# Patient Record
Sex: MALE
[De-identification: names, ages, dates, MRNs, and addresses within clinical notes are randomized per-mention and may not be internally consistent; named-entity substitution may affect disease eponyms.]

---

## 2017-09-01 PROBLEM — Z00.00 ENCOUNTER FOR PREVENTIVE HEALTH EXAMINATION: Status: ACTIVE | Noted: 2017-09-01

## 2017-12-15 ENCOUNTER — APPOINTMENT (OUTPATIENT)
Dept: OTOLARYNGOLOGY | Facility: CLINIC | Age: 27
End: 2017-12-15

## 2018-04-14 ENCOUNTER — EMERGENCY (EMERGENCY)
Facility: HOSPITAL | Age: 28
LOS: 0 days | Discharge: HOME | End: 2018-04-14
Attending: EMERGENCY MEDICINE

## 2018-04-14 VITALS
TEMPERATURE: 98 F | RESPIRATION RATE: 18 BRPM | HEART RATE: 110 BPM | OXYGEN SATURATION: 99 % | SYSTOLIC BLOOD PRESSURE: 145 MMHG | DIASTOLIC BLOOD PRESSURE: 89 MMHG

## 2018-04-14 VITALS
HEART RATE: 90 BPM | SYSTOLIC BLOOD PRESSURE: 126 MMHG | DIASTOLIC BLOOD PRESSURE: 80 MMHG | OXYGEN SATURATION: 98 % | TEMPERATURE: 98 F | RESPIRATION RATE: 18 BRPM

## 2018-04-14 DIAGNOSIS — Z98.890 OTHER SPECIFIED POSTPROCEDURAL STATES: ICD-10-CM
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
## 2018-04-14 DIAGNOSIS — Z98.1 ARTHRODESIS STATUS: Chronic | ICD-10-CM
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
## 2018-04-14 DIAGNOSIS — S80.12XA CONTUSION OF LEFT LOWER LEG, INITIAL ENCOUNTER: ICD-10-CM

## 2018-04-14 DIAGNOSIS — M79.662 PAIN IN LEFT LOWER LEG: ICD-10-CM

## 2018-04-14 LAB
ANION GAP SERPL CALC-SCNC: 13 MMOL/L — SIGNIFICANT CHANGE UP (ref 7–14)
BASOPHILS # BLD AUTO: 0.1 K/UL — SIGNIFICANT CHANGE UP (ref 0–0.2)
BASOPHILS NFR BLD AUTO: 1 % — SIGNIFICANT CHANGE UP (ref 0–1)
BUN SERPL-MCNC: 14 MG/DL — SIGNIFICANT CHANGE UP (ref 10–20)
CALCIUM SERPL-MCNC: 9.3 MG/DL — SIGNIFICANT CHANGE UP (ref 8.5–10.1)
CHLORIDE SERPL-SCNC: 100 MMOL/L — SIGNIFICANT CHANGE UP (ref 98–110)
CO2 SERPL-SCNC: 24 MMOL/L — SIGNIFICANT CHANGE UP (ref 17–32)
CREAT SERPL-MCNC: 0.8 MG/DL — SIGNIFICANT CHANGE UP (ref 0.7–1.5)
EOSINOPHIL # BLD AUTO: 0.48 K/UL — SIGNIFICANT CHANGE UP (ref 0–0.7)
EOSINOPHIL NFR BLD AUTO: 5 % — SIGNIFICANT CHANGE UP (ref 0–8)
GLUCOSE SERPL-MCNC: 96 MG/DL — SIGNIFICANT CHANGE UP (ref 70–99)
HCT VFR BLD CALC: 44.5 % — SIGNIFICANT CHANGE UP (ref 42–52)
HGB BLD-MCNC: 15.2 G/DL — SIGNIFICANT CHANGE UP (ref 14–18)
IMM GRANULOCYTES NFR BLD AUTO: 0.2 % — SIGNIFICANT CHANGE UP (ref 0.1–0.3)
LACTATE SERPL-SCNC: 1.8 MMOL/L — SIGNIFICANT CHANGE UP (ref 0.5–2.2)
LYMPHOCYTES # BLD AUTO: 2.51 K/UL — SIGNIFICANT CHANGE UP (ref 1.2–3.4)
LYMPHOCYTES # BLD AUTO: 26.1 % — SIGNIFICANT CHANGE UP (ref 20.5–51.1)
MCHC RBC-ENTMCNC: 29.5 PG — SIGNIFICANT CHANGE UP (ref 27–31)
MCHC RBC-ENTMCNC: 34.2 G/DL — SIGNIFICANT CHANGE UP (ref 32–37)
MCV RBC AUTO: 86.2 FL — SIGNIFICANT CHANGE UP (ref 80–94)
MONOCYTES # BLD AUTO: 0.85 K/UL — HIGH (ref 0.1–0.6)
MONOCYTES NFR BLD AUTO: 8.8 % — SIGNIFICANT CHANGE UP (ref 1.7–9.3)
NEUTROPHILS # BLD AUTO: 5.66 K/UL — SIGNIFICANT CHANGE UP (ref 1.4–6.5)
NEUTROPHILS NFR BLD AUTO: 58.9 % — SIGNIFICANT CHANGE UP (ref 42.2–75.2)
NRBC # BLD: 0 /100 WBCS — SIGNIFICANT CHANGE UP (ref 0–0)
PLATELET # BLD AUTO: 381 K/UL — SIGNIFICANT CHANGE UP (ref 130–400)
POTASSIUM SERPL-MCNC: 4.4 MMOL/L — SIGNIFICANT CHANGE UP (ref 3.5–5)
POTASSIUM SERPL-SCNC: 4.4 MMOL/L — SIGNIFICANT CHANGE UP (ref 3.5–5)
RBC # BLD: 5.16 M/UL — SIGNIFICANT CHANGE UP (ref 4.7–6.1)
RBC # FLD: 13 % — SIGNIFICANT CHANGE UP (ref 11.5–14.5)
SODIUM SERPL-SCNC: 137 MMOL/L — SIGNIFICANT CHANGE UP (ref 135–146)
WBC # BLD: 9.62 K/UL — SIGNIFICANT CHANGE UP (ref 4.8–10.8)
WBC # FLD AUTO: 9.62 K/UL — SIGNIFICANT CHANGE UP (ref 4.8–10.8)

## 2018-04-14 RX ORDER — CEPHALEXIN 500 MG
500 CAPSULE ORAL EVERY 12 HOURS
Qty: 0 | Refills: 0 | Status: DISCONTINUED | OUTPATIENT
Start: 2018-04-14 | End: 2018-04-14

## 2018-04-14 RX ORDER — KETOROLAC TROMETHAMINE 30 MG/ML
30 SYRINGE (ML) INJECTION ONCE
Qty: 0 | Refills: 0 | Status: DISCONTINUED | OUTPATIENT
Start: 2018-04-14 | End: 2018-04-14

## 2018-04-14 RX ADMIN — Medication 30 MILLIGRAM(S): at 16:51

## 2018-04-14 RX ADMIN — Medication 500 MILLIGRAM(S): at 16:50

## 2018-04-14 NOTE — ED PROVIDER NOTE - PROGRESS NOTE DETAILS
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  Pt voices understanding of use of medications, instructions for care, and reasons to return or to go to ED  Discussed rest, ice, compression, and elevation with patient.   Discussed NSAIDs for anti-inflammatory and pain relief.  Patient advised to f/u with ortho  Pt extensively counseled - warning si/sxs d/w pt. Case d/w pt at length including risks vs benefits of different management/treatment options. Pt instructed to return to ed for re-evaluation or f/u with PCP should sxs persist. Pt verbalizes an understanding & agreement with the plan as discussed

## 2018-04-14 NOTE — ED PROVIDER NOTE - ATTENDING CONTRIBUTION TO CARE
Fell and injured the left shin about one week ago. At that time pt has a lot of swelling and black and blue. Since then the pain, swelling at site of injury and color has been improving. Pt concerned about developing swelling at the ankle. No fever. On exam healing bruise to the left shin. Pt has dependent edema below. No signs of infection. IMP: natural course of injury. Hematoma is resolving. Edema is expected. no calf tenderness. Normal gait. Follow up as needed.

## 2018-04-14 NOTE — ED PROVIDER NOTE - OBJECTIVE STATEMENT
slip/fall getting out of car 1 week ago, banged left leg against car and now has pain, swelling  improving, but still there and given prior h/o ankle surgery wanted to get eval

## 2018-04-14 NOTE — ED PROVIDER NOTE - NS ED ROS FT
Constitutional: no fever, chills, no recent weight loss, change in appetite or malaise  Eyes: no redness/discharge/pain/vision changes  ENT: no rhinorrhea/ear pain/sore throat  Cardiac: No chest pain, SOB or edema.  Respiratory: No cough or respiratory distress  GI: No nausea, vomiting, diarrhea or abdominal pain.  : No dysuria, frequency, urgency or hematuria  MS: no pain to back or other extremities, no loss of ROM, no weakness  Neuro: No headache or weakness. No LOC.  Skin: No skin rash.  Endocrine: No history of thyroid disease or diabetes.  Except as documented in the HPI, all other systems are negative.

## 2019-04-26 ENCOUNTER — EMERGENCY (EMERGENCY)
Facility: HOSPITAL | Age: 29
LOS: 0 days | Discharge: HOME | End: 2019-04-26
Attending: STUDENT IN AN ORGANIZED HEALTH CARE EDUCATION/TRAINING PROGRAM | Admitting: STUDENT IN AN ORGANIZED HEALTH CARE EDUCATION/TRAINING PROGRAM
Payer: MEDICAID

## 2019-04-26 VITALS
SYSTOLIC BLOOD PRESSURE: 141 MMHG | TEMPERATURE: 98 F | HEART RATE: 98 BPM | WEIGHT: 250 LBS | OXYGEN SATURATION: 97 % | RESPIRATION RATE: 18 BRPM | DIASTOLIC BLOOD PRESSURE: 85 MMHG

## 2019-04-26 DIAGNOSIS — M79.673 PAIN IN UNSPECIFIED FOOT: ICD-10-CM

## 2019-04-26 DIAGNOSIS — Z91.013 ALLERGY TO SEAFOOD: ICD-10-CM

## 2019-04-26 DIAGNOSIS — M79.671 PAIN IN RIGHT FOOT: ICD-10-CM

## 2019-04-26 DIAGNOSIS — Z98.1 ARTHRODESIS STATUS: Chronic | ICD-10-CM

## 2019-04-26 DIAGNOSIS — L84 CORNS AND CALLOSITIES: ICD-10-CM

## 2019-04-26 PROCEDURE — 99283 EMERGENCY DEPT VISIT LOW MDM: CPT | Mod: 25

## 2019-04-26 PROCEDURE — 73630 X-RAY EXAM OF FOOT: CPT | Mod: 26,RT

## 2019-04-26 RX ORDER — IBUPROFEN 200 MG
600 TABLET ORAL ONCE
Qty: 0 | Refills: 0 | Status: COMPLETED | OUTPATIENT
Start: 2019-04-26 | End: 2019-04-26

## 2019-04-26 RX ADMIN — Medication 600 MILLIGRAM(S): at 04:56

## 2019-04-26 NOTE — ED PROVIDER NOTE - PHYSICAL EXAMINATION
VITAL SIGNS: I have reviewed nursing notes and confirm.  CONSTITUTIONAL: Well-developed; well-nourished; in no acute distress. pt comfortable.  SKIN: skin exam is warm and dry, erythematous callus formation  to medial base of 1st toe r.  HEAD: Normocephalic; atraumatic.  EYES:  EOM intact; conjunctiva and sclera clear.  ENT: No nasal discharge; airway clear. moist oral mucosa  NECK: Supple; non tender.  CARD: S1, S2 normal; no murmurs, gallops, or rubs. Regular rate and rhythm. posterior tibial and radial pulses 2+  RESP: No wheezes, rales or rhonchi. cta b/l. no use of accessory muscles. no retractions  EXT: Normal ROM. No  cyanosis or edema  NEURO: Alert, oriented, grossly unremarkable.    PSYCH: Cooperative, appropriate.

## 2019-04-26 NOTE — ED PROVIDER NOTE - ATTENDING CONTRIBUTION TO CARE
28 yo m here for atraumatic R foot pain. more so base of 1st toe distal/medial. no fever/chills/erythema    vss  gen- NAD, aaox3  card-rrr  lungs-ctab, no wheezing or rhonchi  abd-sntnd, no guarding or rebound  neuro- full str/sensation, cn ii-xii grossly intact, normal coordination   R foot- no swelling/erythema, mild tenderness to base of 1st toe, no gross deformity, no hot/tender joint    likely sprain/overuse injury  xr r/o fx  no evidence of septic or inflammatory joint

## 2019-04-26 NOTE — ED PROVIDER NOTE - CLINICAL SUMMARY MEDICAL DECISION MAKING FREE TEXT BOX
likely sprain/overuse injury  xr w/o fx or dislocation  no evidence of septic or inflammatory joint    splint, pod/ortho f/u

## 2019-04-26 NOTE — ED PROVIDER NOTE - OBJECTIVE STATEMENT
30y/o M w/ sleep apnea and copd presents for r. foot pain that started 2-3 days ago it's on the base of the 1st toe dorsally and medially.  no fevers or chills. no trauma to the area pt remembers. mild redness

## 2019-06-01 ENCOUNTER — OUTPATIENT (OUTPATIENT)
Dept: OUTPATIENT SERVICES | Facility: HOSPITAL | Age: 29
LOS: 1 days | End: 2019-06-01

## 2019-06-01 DIAGNOSIS — Z98.1 ARTHRODESIS STATUS: Chronic | ICD-10-CM

## 2019-06-14 DIAGNOSIS — Z71.89 OTHER SPECIFIED COUNSELING: ICD-10-CM

## 2019-09-28 ENCOUNTER — OUTPATIENT (OUTPATIENT)
Dept: OUTPATIENT SERVICES | Facility: HOSPITAL | Age: 29
LOS: 1 days | Discharge: HOME | End: 2019-09-28

## 2019-09-28 DIAGNOSIS — Z13.220 ENCOUNTER FOR SCREENING FOR LIPOID DISORDERS: ICD-10-CM

## 2019-09-28 DIAGNOSIS — Z13.0 ENCOUNTER FOR SCREENING FOR DISEASES OF THE BLOOD AND BLOOD-FORMING ORGANS AND CERTAIN DISORDERS INVOLVING THE IMMUNE MECHANISM: ICD-10-CM

## 2019-09-28 DIAGNOSIS — Z00.00 ENCOUNTER FOR GENERAL ADULT MEDICAL EXAMINATION WITHOUT ABNORMAL FINDINGS: ICD-10-CM

## 2019-09-28 DIAGNOSIS — Z98.1 ARTHRODESIS STATUS: Chronic | ICD-10-CM

## 2019-09-28 DIAGNOSIS — Z13.21 ENCOUNTER FOR SCREENING FOR NUTRITIONAL DISORDER: ICD-10-CM

## 2019-09-28 DIAGNOSIS — Z13.228 ENCOUNTER FOR SCREENING FOR OTHER METABOLIC DISORDERS: ICD-10-CM

## 2019-10-01 ENCOUNTER — OUTPATIENT (OUTPATIENT)
Dept: OUTPATIENT SERVICES | Facility: HOSPITAL | Age: 29
LOS: 1 days | End: 2019-10-01
Payer: MEDICAID

## 2019-10-01 DIAGNOSIS — Z98.1 ARTHRODESIS STATUS: Chronic | ICD-10-CM

## 2019-10-07 DIAGNOSIS — Z71.89 OTHER SPECIFIED COUNSELING: ICD-10-CM

## 2020-06-15 PROCEDURE — G9005: CPT

## 2025-06-12 ENCOUNTER — NON-APPOINTMENT (OUTPATIENT)
Age: 35
End: 2025-06-12

## 2025-07-11 ENCOUNTER — OFFICE VISIT (OUTPATIENT)
Dept: URGENT CARE | Facility: CLINIC | Age: 35
End: 2025-07-11
Payer: COMMERCIAL

## 2025-07-11 VITALS
DIASTOLIC BLOOD PRESSURE: 88 MMHG | TEMPERATURE: 97.2 F | SYSTOLIC BLOOD PRESSURE: 143 MMHG | OXYGEN SATURATION: 96 % | HEART RATE: 105 BPM | RESPIRATION RATE: 18 BRPM

## 2025-07-11 DIAGNOSIS — R51.9 INTRACTABLE HEADACHE, UNSPECIFIED CHRONICITY PATTERN, UNSPECIFIED HEADACHE TYPE: Primary | ICD-10-CM

## 2025-07-11 LAB — S PYO AG THROAT QL: NEGATIVE

## 2025-07-11 PROCEDURE — 87880 STREP A ASSAY W/OPTIC: CPT | Performed by: PHYSICIAN ASSISTANT

## 2025-07-11 PROCEDURE — 99203 OFFICE O/P NEW LOW 30 MIN: CPT | Performed by: PHYSICIAN ASSISTANT

## 2025-07-11 RX ORDER — MULTIVITAMIN
1 TABLET ORAL DAILY
COMMUNITY

## 2025-07-11 NOTE — PATIENT INSTRUCTIONS
Patient Instructions  Advised patient to rest, stay hydrated with electrolyte like fluids like Gatorade and take Tylenol or ibuprofen as needed for discomfort.  Should you develop worsening headache or the worst headache of your life, visual changes, sensitivity to light, confusion, change in gait, weakness report to the ER immediately.  Rapid strep negative.   Follow up with PCP in 3-5 days.  Proceed to  ER if symptoms worsen.  If tests are performed, our office will contact you with results only if changes need to made to the care plan discussed with you at the visit. You can review your full results on [facility] MyChart.

## 2025-07-11 NOTE — LETTER
July 11, 2025     Patient: Quintin Damian   YOB: 1990   Date of Visit: 7/11/2025       To Whom it May Concern:    Quintin Damian was seen in my clinic on 7/11/2025. He may return to work on 7/14/25.    If you have any questions or concerns, please don't hesitate to call.         Sincerely,          Mariana Lei PA-C        CC: No Recipients

## 2025-07-11 NOTE — PROGRESS NOTES
[facility]  Name: Quintin Damian      : 1990      MRN: [id number]  Encounter Provider: Mariana Lei PA-C  Encounter Date: 2025   Encounter department: [facility]  :  Assessment & Plan  Intractable headache, unspecified chronicity pattern, unspecified headache type    Orders:    POCT rapid strepA        Patient Instructions  Advised patient to rest, stay hydrated with electrolyte like fluids like Gatorade and take Tylenol or ibuprofen as needed for discomfort.  Should you develop worsening headache or the worst headache of your life, visual changes, sensitivity to light, confusion, change in gait, weakness report to the ER immediately.  Rapid strep negative.   Follow up with PCP in 3-5 days.  Proceed to  ER if symptoms worsen.  If tests are performed, our office will contact you with results only if changes need to made to the care plan discussed with you at the visit. [facility]    Chief Complaint:   Chief Complaint   Patient presents with    Headache     Patient here with headache, cough and congestion for about 5 days now. He is requesting a work note to have him off this weekend, because he does not feel well enough to drive back to [place] for work this weekend.      History of Present Illness   Is a 35-year-old male here complaining of bodyaches since Monday.  He notes on Monday he had a cough which has since resolved.  He notes yesterday started with a headache which he rates a 6 out of 10.  He notes that is not the worst headache of his life.  He does note that he has a mild sore throat.  He denies any visual changes, change in gait, confusion, dizziness, sensitivity to light, abdominal pain, fever, nasal congestion, vomiting or diarrhea, shortness of breath or chest pain.  Patient has taken Tylenol and Advil for symptoms.  He notes he last took medication at 8 AM but cannot remember which one he  "took.    Headache        Review of Systems   Constitutional:  Negative for fever.   HENT:  Positive for sore throat. Negative for congestion and ear pain.    Respiratory:  Negative for shortness of breath.    Cardiovascular:  Negative for chest pain.   Gastrointestinal:  Negative for diarrhea and vomiting.   Musculoskeletal:  Positive for myalgias.   Neurological:  Positive for headaches. Negative for dizziness and weakness.   Psychiatric/Behavioral:  Negative for confusion.      Past Medical History   Past Medical History[1]  Past Surgical History[2]  Family History[3]  he   Current Outpatient Medications   Medication Instructions    Multiple Vitamin (multivitamin) tablet 1 tablet, Daily   Allergies[4]     Objective   /88   Pulse 105   Temp (!) 97.2 °F (36.2 °C)   Resp 18   SpO2 96%      Physical Exam  Vitals and nursing note reviewed.   Constitutional:       General: He is not in acute distress.     Appearance: Normal appearance. He is not ill-appearing, toxic-appearing or diaphoretic.   HENT:      Right Ear: Tympanic membrane, ear canal and external ear normal.      Left Ear: Tympanic membrane, ear canal and external ear normal.      Nose: Nose normal.      Mouth/Throat:      Mouth: Mucous membranes are moist.      Pharynx: No posterior oropharyngeal erythema.     Cardiovascular:      Rate and Rhythm: Normal rate and regular rhythm.   Pulmonary:      Effort: Pulmonary effort is normal.      Breath sounds: Normal breath sounds.     Neurological:      General: No focal deficit present.      Mental Status: He is alert and oriented to person, place, and time.      Motor: No weakness.      Gait: Gait normal.     Psychiatric:         Mood and Affect: Mood normal.         Behavior: Behavior normal.         Portions of the record may have been created with voice recognition software.  Occasional wrong word or \"sound a like\" substitutions may have occurred due to the inherent limitations of voice recognition " software.  Read the chart carefully and recognize, using context, where substitutions have occurred.       [1] No past medical history on file.  [2] No past surgical history on file.  [3] No family history on file.  [4]   Allergies  Allergen Reactions    Shellfish-Derived Products - Food Allergy Hives